# Patient Record
Sex: FEMALE | Race: WHITE | NOT HISPANIC OR LATINO | Employment: PART TIME | URBAN - METROPOLITAN AREA
[De-identification: names, ages, dates, MRNs, and addresses within clinical notes are randomized per-mention and may not be internally consistent; named-entity substitution may affect disease eponyms.]

---

## 2017-04-27 ENCOUNTER — OFFICE VISIT (OUTPATIENT)
Dept: INTERNAL MEDICINE | Facility: CLINIC | Age: 42
End: 2017-04-27

## 2017-04-27 VITALS
SYSTOLIC BLOOD PRESSURE: 116 MMHG | OXYGEN SATURATION: 98 % | HEART RATE: 97 BPM | BODY MASS INDEX: 24.96 KG/M2 | TEMPERATURE: 98.1 F | DIASTOLIC BLOOD PRESSURE: 56 MMHG | HEIGHT: 67 IN | WEIGHT: 159 LBS

## 2017-04-27 DIAGNOSIS — G43.009 MIGRAINE WITHOUT AURA AND RESPONSIVE TO TREATMENT: Primary | ICD-10-CM

## 2017-04-27 DIAGNOSIS — G47.00 INSOMNIA, UNSPECIFIED TYPE: ICD-10-CM

## 2017-04-27 PROCEDURE — 99396 PREV VISIT EST AGE 40-64: CPT | Performed by: FAMILY MEDICINE

## 2017-04-27 PROCEDURE — 99213 OFFICE O/P EST LOW 20 MIN: CPT | Performed by: FAMILY MEDICINE

## 2017-04-27 RX ORDER — PROPRANOLOL HYDROCHLORIDE 10 MG/1
10 TABLET ORAL 2 TIMES DAILY
Qty: 60 TABLET | Refills: 1 | Status: SHIPPED | OUTPATIENT
Start: 2017-04-27

## 2017-04-27 RX ORDER — TIZANIDINE 2 MG/1
2 TABLET ORAL EVERY 6 HOURS
COMMUNITY
Start: 2015-12-28

## 2017-04-27 NOTE — PROGRESS NOTES
Subjective   Meaghan Dalton is a 41 y.o. female.     Chief Complaint   Patient presents with   • transferring from Dr. [place]   • Annual Exam   • patient sees a neurologist for her migraines         History of Present Illness   Meaghan Dalton 41 y.o. female who presents for an Annual Wellness Visit.  she has a history of   Patient Active Problem List   Diagnosis   • Herpes labialis   • Insomnia   • Migraine   • Skin disorder   • Migraine without aura and responsive to treatment   .  she has been feeling well.  Labs results discussed in detail with the patient.  Plan to update vaccines if needed today.  I  reviewed health maintenance with her as part of my preventative care plan.  Life stressors as well as moving work she's had increased frequency of her headaches similar type of headache there is no specific pounding improved with Imitrex and antiinflammatorie she doesn't feel that her pulmonary medications working so well but the nortriptyline does help with sleep.  Her cold sores are once annually she didn't get the prescription for medication consult was $400 suspect also Zovirax.    Health Habits:  Dental Exam. up to date  Eye Exam. up to date  Exercise: 5 times/week.  Current exercise activities include: aerobics      The following portions of the patient's history were reviewed and updated as appropriate: allergies, current medications, past family history, past medical history, past social history, past surgical history and problem list.    Review of Systems   Constitutional: Negative for appetite change, fever and unexpected weight change.   HENT: Negative for ear pain, facial swelling and sore throat.    Eyes: Negative for pain and visual disturbance.   Respiratory: Negative for chest tightness, shortness of breath and wheezing.    Cardiovascular: Negative for chest pain and palpitations.   Gastrointestinal: Negative for abdominal pain and blood in stool.   Endocrine: Negative.    Genitourinary: Negative  for difficulty urinating and hematuria.   Musculoskeletal: Negative for joint swelling.   Neurological: Positive for headaches. Negative for tremors, seizures and syncope.   Hematological: Negative for adenopathy.   Psychiatric/Behavioral: Negative.        Objective   Physical Exam   Constitutional: She is oriented to person, place, and time. She appears well-developed and well-nourished. No distress.   HENT:   Head: Normocephalic and atraumatic.   Eyes: Conjunctivae and EOM are normal. Pupils are equal, round, and reactive to light. Right eye exhibits no discharge. Left eye exhibits no discharge. No scleral icterus.   Neck: Normal range of motion. Neck supple. No tracheal deviation present. No thyromegaly present.   Cardiovascular: Normal rate, regular rhythm, normal heart sounds, intact distal pulses and normal pulses.  Exam reveals no gallop.    No murmur heard.  Pulmonary/Chest: Effort normal and breath sounds normal. No respiratory distress. She has no wheezes. She has no rales.   Musculoskeletal: Normal range of motion.   Neurological: She is alert and oriented to person, place, and time. She exhibits normal muscle tone. Coordination normal.   Skin: Skin is warm. No rash noted. No erythema. No pallor.   Psychiatric: She has a normal mood and affect. Her behavior is normal. Judgment and thought content normal.   Nursing note and vitals reviewed.      Assessment/Plan   Meaghan was seen today for transferring from dr. [place], annual exam and patient sees a neurologist for her migraines.    Diagnoses and all orders for this visit:    Migraine without aura and responsive to treatment    Insomnia, unspecified type    Other orders  -     propranolol (INDERAL) 10 MG tablet; Take 1 tablet by mouth 2 (Two) Times a Day.      She will stop feverfew magnesium potassium trial of initiating propranolol twice a day follow-up in one month benefit of headaches she has appointment in 2 months with her neurologist  Labs reviewed  with normal CBC lipids thyroid function